# Patient Record
Sex: MALE | Race: WHITE
[De-identification: names, ages, dates, MRNs, and addresses within clinical notes are randomized per-mention and may not be internally consistent; named-entity substitution may affect disease eponyms.]

---
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
## 2019-04-12 ENCOUNTER — HOSPITAL ENCOUNTER (OUTPATIENT)
Dept: HOSPITAL 58 - AMBL | Age: 19
LOS: 1 days | Discharge: ANOTHER HOSPITAL | End: 2019-04-13
Attending: INTERNAL MEDICINE

## 2019-04-12 VITALS — BODY MASS INDEX: 28.5 KG/M2

## 2019-04-12 DIAGNOSIS — R41.82: ICD-10-CM
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
## 2019-04-12 DIAGNOSIS — S09.90XA: Primary | ICD-10-CM

## 2019-04-13 ENCOUNTER — APPOINTMENT (OUTPATIENT)
Dept: CT IMAGING | Age: 19
End: 2019-04-13

## 2019-04-13 ENCOUNTER — HOSPITAL ENCOUNTER (EMERGENCY)
Age: 19
Discharge: HOME | End: 2019-04-13

## 2019-04-13 VITALS
HEART RATE: 68 BPM | DIASTOLIC BLOOD PRESSURE: 82 MMHG | HEIGHT: 74 IN | RESPIRATION RATE: 18 BRPM | OXYGEN SATURATION: 96 % | BODY MASS INDEX: 20.79 KG/M2 | SYSTOLIC BLOOD PRESSURE: 118 MMHG | WEIGHT: 162 LBS | TEMPERATURE: 97.5 F

## 2019-04-13 DIAGNOSIS — S09.90XA INJURY OF HEAD, INITIAL ENCOUNTER: Primary | ICD-10-CM
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
## 2019-04-13 PROCEDURE — 6370000000 HC RX 637 (ALT 250 FOR IP): Performed by: NURSE PRACTITIONER

## 2019-04-13 PROCEDURE — 99284 EMERGENCY DEPT VISIT MOD MDM: CPT

## 2019-04-13 PROCEDURE — 99285 EMERGENCY DEPT VISIT HI MDM: CPT | Performed by: NURSE PRACTITIONER

## 2019-04-13 PROCEDURE — 93005 ELECTROCARDIOGRAM TRACING: CPT

## 2019-04-13 PROCEDURE — 70450 CT HEAD/BRAIN W/O DYE: CPT

## 2019-04-13 RX ORDER — IBUPROFEN 200 MG
400 TABLET ORAL ONCE
Status: COMPLETED | OUTPATIENT
Start: 2019-04-13 | End: 2019-04-13

## 2019-04-13 RX ADMIN — IBUPROFEN 400 MG: 200 TABLET, FILM COATED ORAL at 01:25

## 2019-04-13 ASSESSMENT — PAIN DESCRIPTION - LOCATION: LOCATION: HEAD;LEG

## 2019-04-13 ASSESSMENT — PAIN SCALES - GENERAL
PAINLEVEL_OUTOF10: 6
PAINLEVEL_OUTOF10: 6
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
## 2019-04-13 NOTE — ED NOTES
Pt ambulated in the hallway with no assistance needed. Burt Prieto witnessed.       Hallie Whitaker RN  04/13/19 [id number]

## 2019-04-13 NOTE — ED NOTES
Bed: 05  Expected date: 4/13/19  Expected time:   Means of arrival: [id number] [address] [facility]     Suzi Flores RN  04/13/19 1141

## 2019-04-15 LAB
EKG P AXIS: 45 DEGREES
EKG P-R INTERVAL: 140 MS
EKG Q-T INTERVAL: 366 MS
EKG QRS DURATION: 98 MS
EKG QTC CALCULATION (BAZETT): 385 MS
EKG T AXIS: 36 DEGREES

## 2019-04-23 ENCOUNTER — APPOINTMENT (OUTPATIENT)
Dept: GENERAL RADIOLOGY | Facility: HOSPITAL | Age: 19
End: 2019-04-23

## 2019-04-23 ENCOUNTER — HOSPITAL ENCOUNTER (EMERGENCY)
Facility: HOSPITAL | Age: 19
Discharge: HOME | End: 2019-04-23
Admitting: EMERGENCY MEDICINE

## 2019-04-23 ENCOUNTER — HOSPITAL ENCOUNTER (OUTPATIENT)
Dept: HOSPITAL 58 - AMBL | Age: 19
Discharge: ANOTHER HOSPITAL | End: 2019-04-23
Attending: FAMILY MEDICINE

## 2019-04-23 VITALS
WEIGHT: 233.3 LBS | HEART RATE: 84 BPM | RESPIRATION RATE: 16 BRPM | DIASTOLIC BLOOD PRESSURE: 72 MMHG | SYSTOLIC BLOOD PRESSURE: 143 MMHG | HEIGHT: 74 IN | OXYGEN SATURATION: 98 % | BODY MASS INDEX: 29.94 KG/M2 | TEMPERATURE: 98.5 F

## 2019-04-23 VITALS — BODY MASS INDEX: 28.5 KG/M2

## 2019-04-23 DIAGNOSIS — R00.0: ICD-10-CM

## 2019-04-23 DIAGNOSIS — R06.02: ICD-10-CM
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
## 2019-04-23 DIAGNOSIS — R07.89 CHEST WALL PAIN: Primary | ICD-10-CM

## 2019-04-23 DIAGNOSIS — R07.89: Primary | ICD-10-CM

## 2019-04-23 PROCEDURE — 71046 X-RAY EXAM CHEST 2 VIEWS: CPT

## 2019-04-23 PROCEDURE — 99283 EMERGENCY DEPT VISIT LOW MDM: CPT

## 2019-04-23 RX ORDER — NAPROXEN 500 MG/1
500 TABLET ORAL 2 TIMES DAILY PRN
Qty: 10 TABLET | Refills: 0 | Status: SHIPPED | OUTPATIENT
Start: 2019-04-23 | End: 2019-04-28

## 2021-04-10 ENCOUNTER — HOSPITAL ENCOUNTER (EMERGENCY)
Age: 21
Discharge: HOME | End: 2021-04-10
Attending: EMERGENCY MEDICINE

## 2021-04-10 ENCOUNTER — APPOINTMENT (OUTPATIENT)
Dept: GENERAL RADIOLOGY | Age: 21
End: 2021-04-10

## 2021-04-10 VITALS
TEMPERATURE: 97.8 F | OXYGEN SATURATION: 96 % | WEIGHT: 280 LBS | SYSTOLIC BLOOD PRESSURE: 134 MMHG | HEIGHT: 72 IN | DIASTOLIC BLOOD PRESSURE: 85 MMHG | RESPIRATION RATE: 16 BRPM | HEART RATE: 79 BPM | BODY MASS INDEX: 37.93 KG/M2

## 2021-04-10 DIAGNOSIS — M25.512 ACUTE PAIN OF LEFT SHOULDER: Primary | ICD-10-CM

## 2021-04-10 DIAGNOSIS — S43.002A SHOULDER SUBLUXATION, LEFT, INITIAL ENCOUNTER: ICD-10-CM

## 2021-04-10 PROCEDURE — 99283 EMERGENCY DEPT VISIT LOW MDM: CPT

## 2021-04-10 PROCEDURE — 73030 X-RAY EXAM OF SHOULDER: CPT

## 2021-04-10 PROCEDURE — 73020 X-RAY EXAM OF SHOULDER: CPT

## 2021-04-10 RX ORDER — NAPROXEN 500 MG/1
500 TABLET ORAL 2 TIMES DAILY WITH MEALS
Qty: 20 TABLET | Refills: 0 | Status: SHIPPED | OUTPATIENT
Start: 2021-04-10 | End: 2021-04-20

## 2021-04-10 RX ORDER — CYCLOBENZAPRINE HCL 10 MG
10 TABLET ORAL 3 TIMES DAILY PRN
Qty: 15 TABLET | Refills: 0 | Status: SHIPPED | OUTPATIENT
Start: 2021-04-10 | End: 2021-04-20

## 2021-04-10 ASSESSMENT — ENCOUNTER SYMPTOMS
EYE REDNESS: 0
ABDOMINAL PAIN: 0
SHORTNESS OF BREATH: 0
RHINORRHEA: 0
DIARRHEA: 0
COUGH: 0
EYE PAIN: 0
VOICE CHANGE: 0
VOMITING: 0
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
